# Patient Record
Sex: FEMALE | Race: WHITE | URBAN - METROPOLITAN AREA
[De-identification: names, ages, dates, MRNs, and addresses within clinical notes are randomized per-mention and may not be internally consistent; named-entity substitution may affect disease eponyms.]

---

## 2023-06-02 ENCOUNTER — OFFICE VISIT (OUTPATIENT)
Dept: URBAN - METROPOLITAN AREA CLINIC 68 | Facility: CLINIC | Age: 63
End: 2023-06-02

## 2023-06-02 RX ORDER — POLYETHYLENE GLYCOL 3350, SODIUM SULFATE, SODIUM CHLORIDE, POTASSIUM CHLORIDE, ASCORBIC ACID, SODIUM ASCORBATE 140-9-5.2G
ML KIT ORAL ONCE
Qty: 1 KIT | OUTPATIENT
Start: 2023-06-02 | End: 2023-06-03

## 2023-06-02 RX ORDER — INSULIN DEGLUDEC INJECTION 100 U/ML
AS DIRECTED INJECTION, SOLUTION SUBCUTANEOUS
Status: ACTIVE | COMMUNITY

## 2023-06-02 RX ORDER — ONDANSETRON 4 MG/1
1 TABLET ON THE TONGUE AND ALLOW TO DISSOLVE TABLET, ORALLY DISINTEGRATING ORAL EVERY 8 HOURS
Qty: 3 TABLET | OUTPATIENT
Start: 2023-06-02

## 2023-06-02 NOTE — HPI-MIGRATED HPI
General : Upon questioning she stated feeling well without acute complaints. Upon interrogation she does refer episodes of blood tinge toilet paper upon cleaning herself. She denied episodes of black/tarry stools (melena). She also denied any unexplained significant weight loss, frequent abdominal pain/distention, nausea, vomits, early satiety, tenesmus, rectal pain and changes in bowel habits or stool caliber. She did not complain of episodes of diarrhea interspersed with constipation.

## 2023-06-08 ENCOUNTER — CLAIMS CREATED FROM THE CLAIM WINDOW (OUTPATIENT)
Dept: URBAN - METROPOLITAN AREA CLINIC 4 | Facility: CLINIC | Age: 63
End: 2023-06-08
Payer: COMMERCIAL

## 2023-06-08 ENCOUNTER — WEB ENCOUNTER (OUTPATIENT)
Dept: URBAN - METROPOLITAN AREA SURGERY CENTER 12 | Facility: SURGERY CENTER | Age: 63
End: 2023-06-08

## 2023-06-08 ENCOUNTER — OUT OF OFFICE VISIT (OUTPATIENT)
Dept: URBAN - METROPOLITAN AREA SURGERY CENTER 12 | Facility: SURGERY CENTER | Age: 63
End: 2023-06-08
Payer: COMMERCIAL

## 2023-06-08 DIAGNOSIS — R19.5 HEME POSITIVE STOOL: ICD-10-CM

## 2023-06-08 DIAGNOSIS — K92.1 HEMATOCHEZIA: ICD-10-CM

## 2023-06-08 DIAGNOSIS — K56.690 OTHER PARTIAL INTESTINAL OBSTRUCTION: ICD-10-CM

## 2023-06-08 DIAGNOSIS — K62.89 RECTAL MASS: ICD-10-CM

## 2023-06-08 DIAGNOSIS — D12.8 BENIGN NEOPLASM OF RECTUM: ICD-10-CM

## 2023-06-08 DIAGNOSIS — D49.0 NEOPLASM OF UNSPECIFIED BEHAVIOR OF DIGESTIVE SYSTEM: ICD-10-CM

## 2023-06-08 PROCEDURE — 00811 ANES LWR INTST NDSC NOS: CPT | Performed by: NURSE ANESTHETIST, CERTIFIED REGISTERED

## 2023-06-08 PROCEDURE — 45381 COLONOSCOPY SUBMUCOUS NJX: CPT | Performed by: INTERNAL MEDICINE

## 2023-06-08 PROCEDURE — 45380 COLONOSCOPY AND BIOPSY: CPT | Performed by: INTERNAL MEDICINE

## 2023-06-08 PROCEDURE — 88305 TISSUE EXAM BY PATHOLOGIST: CPT | Performed by: PATHOLOGY

## 2023-06-08 RX ORDER — ONDANSETRON 4 MG/1
1 TABLET ON THE TONGUE AND ALLOW TO DISSOLVE TABLET, ORALLY DISINTEGRATING ORAL EVERY 8 HOURS
Qty: 3 TABLET | Status: ACTIVE | COMMUNITY
Start: 2023-06-02

## 2023-06-08 RX ORDER — INSULIN DEGLUDEC INJECTION 100 U/ML
AS DIRECTED INJECTION, SOLUTION SUBCUTANEOUS
Status: ACTIVE | COMMUNITY

## 2023-06-14 ENCOUNTER — LAB OUTSIDE AN ENCOUNTER (OUTPATIENT)
Dept: URBAN - METROPOLITAN AREA CLINIC 68 | Facility: CLINIC | Age: 63
End: 2023-06-14

## 2023-06-14 ENCOUNTER — TELEPHONE ENCOUNTER (OUTPATIENT)
Dept: URBAN - METROPOLITAN AREA CLINIC 68 | Facility: CLINIC | Age: 63
End: 2023-06-14

## 2023-06-16 ENCOUNTER — TELEPHONE ENCOUNTER (OUTPATIENT)
Dept: URBAN - METROPOLITAN AREA CLINIC 68 | Facility: CLINIC | Age: 63
End: 2023-06-16

## 2023-06-16 ENCOUNTER — OFFICE VISIT (OUTPATIENT)
Dept: URBAN - METROPOLITAN AREA CLINIC 68 | Facility: CLINIC | Age: 63
End: 2023-06-16
Payer: COMMERCIAL

## 2023-06-16 VITALS
SYSTOLIC BLOOD PRESSURE: 120 MMHG | BODY MASS INDEX: 23.56 KG/M2 | HEIGHT: 60 IN | DIASTOLIC BLOOD PRESSURE: 80 MMHG | WEIGHT: 120 LBS

## 2023-06-16 DIAGNOSIS — K62.89 RECTAL MASS: ICD-10-CM

## 2023-06-16 DIAGNOSIS — K92.1 HEMATOCHEZIA: ICD-10-CM

## 2023-06-16 PROBLEM — 248523006: Status: ACTIVE | Noted: 2023-06-08

## 2023-06-16 PROCEDURE — 99214 OFFICE O/P EST MOD 30 MIN: CPT | Performed by: INTERNAL MEDICINE

## 2023-06-16 RX ORDER — INSULIN DEGLUDEC INJECTION 100 U/ML
AS DIRECTED INJECTION, SOLUTION SUBCUTANEOUS
Status: ACTIVE | COMMUNITY

## 2023-06-16 RX ORDER — ONDANSETRON 4 MG/1
1 TABLET ON THE TONGUE AND ALLOW TO DISSOLVE TABLET, ORALLY DISINTEGRATING ORAL EVERY 8 HOURS
Qty: 3 TABLET | Status: ACTIVE | COMMUNITY
Start: 2023-06-02

## 2023-06-16 NOTE — HPI-TODAY'S VISIT:
Case of a 63 yof that comes in today for follow up. She recently underwent EGD due to rectal bleeding iron def anemia. She was found with a lesion about 10cm from the anal verge. Biopsies remarkable for TV w HGD but endoscopically this is rectal Ca. Staging CT CEA have been requested. She refers today she is doing fine. She continues with bouts of rectal bleeding. No abdominal pain. Passing gasses having BM.

## 2023-06-19 ENCOUNTER — LAB OUTSIDE AN ENCOUNTER (OUTPATIENT)
Dept: URBAN - METROPOLITAN AREA CLINIC 68 | Facility: CLINIC | Age: 63
End: 2023-06-19

## 2023-06-21 ENCOUNTER — TELEPHONE ENCOUNTER (OUTPATIENT)
Dept: URBAN - METROPOLITAN AREA CLINIC 68 | Facility: CLINIC | Age: 63
End: 2023-06-21

## 2023-06-22 LAB
A/G RATIO: 1.4
A/G RATIO: 1.4
ABSOLUTE BAND NEUTROPHILS: (no result)
ABSOLUTE BASOPHILS: (no result)
ABSOLUTE BASOPHILS: 88
ABSOLUTE BLASTS: (no result)
ABSOLUTE EOSINOPHILS: (no result)
ABSOLUTE EOSINOPHILS: 725
ABSOLUTE LYMPHOCYTES: (no result)
ABSOLUTE LYMPHOCYTES: 1196
ABSOLUTE METAMYELOCYTES: (no result)
ABSOLUTE MONOCYTES: (no result)
ABSOLUTE MONOCYTES: 784
ABSOLUTE MYELOCYTES: (no result)
ABSOLUTE NEUTROPHILS: (no result)
ABSOLUTE NEUTROPHILS: 7007
ABSOLUTE NUCLEATED RBC: (no result)
ABSOLUTE PROMYELOCYTES: (no result)
ALBUMIN: 3.6
ALBUMIN: 3.6
ALKALINE PHOSPHATASE: 61
ALKALINE PHOSPHATASE: 61
ALT (SGPT): 14
ALT (SGPT): 14
AST (SGOT): 15
AST (SGOT): 15
BAND NEUTROPHILS: (no result)
BASOPHILS: (no result)
BASOPHILS: 0.9
BILIRUBIN, TOTAL: 0.3
BILIRUBIN, TOTAL: 0.3
BLASTS: (no result)
BUN/CREATININE RATIO: (no result)
BUN/CREATININE RATIO: (no result)
BUN: 10
BUN: 10
CALCIUM: 8.8
CALCIUM: 8.8
CARBON DIOXIDE, TOTAL: 30
CARBON DIOXIDE, TOTAL: 30
CEA: (no result)
CEA: 9.5
CHLORIDE: 102
CHLORIDE: 102
COMMENT(S): (no result)
CREATININE: 0.75
CREATININE: 0.75
EGFR: 88
EGFR: 88
EOSINOPHILS: (no result)
EOSINOPHILS: 7.4
GLOBULIN, TOTAL: 2.6
GLOBULIN, TOTAL: 2.6
GLUCOSE: 220
GLUCOSE: 220
HEMATOCRIT: (no result)
HEMATOCRIT: 37.2
HEMOGLOBIN: (no result)
HEMOGLOBIN: 11.9
LYMPHOCYTES: (no result)
LYMPHOCYTES: 12.2
MCH: (no result)
MCH: 28.7
MCHC: (no result)
MCHC: 32
MCV: (no result)
MCV: 89.9
METAMYELOCYTES: (no result)
MONOCYTES: (no result)
MONOCYTES: 8
MPV: (no result)
MPV: 11.7
MYELOCYTES: (no result)
NEUTROPHILS: (no result)
NEUTROPHILS: 71.5
NUCLEATED RBC: (no result)
PLATELET COUNT: (no result)
PLATELET COUNT: 290
POTASSIUM: 4.5
POTASSIUM: 4.5
PROMYELOCYTES: (no result)
PROTEIN, TOTAL: 6.2
PROTEIN, TOTAL: 6.2
RDW: (no result)
RDW: 15
REACTIVE LYMPHOCYTES: (no result)
RED BLOOD CELL COUNT: (no result)
RED BLOOD CELL COUNT: 4.14
SODIUM: 139
SODIUM: 139
WHITE BLOOD CELL COUNT: (no result)
WHITE BLOOD CELL COUNT: 9.8

## 2023-06-23 ENCOUNTER — TELEPHONE ENCOUNTER (OUTPATIENT)
Dept: URBAN - METROPOLITAN AREA CLINIC 68 | Facility: CLINIC | Age: 63
End: 2023-06-23

## 2023-06-23 PROBLEM — 445201008: Status: ACTIVE | Noted: 2023-06-23

## 2023-06-27 ENCOUNTER — DASHBOARD ENCOUNTERS (OUTPATIENT)
Age: 63
End: 2023-06-27

## 2023-06-27 ENCOUNTER — TELEPHONE ENCOUNTER (OUTPATIENT)
Dept: URBAN - METROPOLITAN AREA CLINIC 68 | Facility: CLINIC | Age: 63
End: 2023-06-27